# Patient Record
Sex: MALE | Employment: OTHER | URBAN - METROPOLITAN AREA
[De-identification: names, ages, dates, MRNs, and addresses within clinical notes are randomized per-mention and may not be internally consistent; named-entity substitution may affect disease eponyms.]

---

## 2022-01-14 ENCOUNTER — HOSPITAL ENCOUNTER (EMERGENCY)
Facility: HOSPITAL | Age: 87
Discharge: HOME | End: 2022-01-14
Attending: STUDENT IN AN ORGANIZED HEALTH CARE EDUCATION/TRAINING PROGRAM
Payer: MEDICARE

## 2022-01-14 VITALS
TEMPERATURE: 97 F | RESPIRATION RATE: 20 BRPM | OXYGEN SATURATION: 99 % | SYSTOLIC BLOOD PRESSURE: 189 MMHG | HEART RATE: 71 BPM | DIASTOLIC BLOOD PRESSURE: 84 MMHG

## 2022-01-14 DIAGNOSIS — R55 SYNCOPE: ICD-10-CM

## 2022-01-14 LAB
ALBUMIN SERPL BCP-MCNC: 4.1 G/DL (ref 3.5–5.2)
ALP SERPL-CCNC: 77 U/L (ref 55–135)
ALT SERPL W/O P-5'-P-CCNC: 27 U/L (ref 10–44)
ANION GAP SERPL CALC-SCNC: 8 MMOL/L (ref 8–16)
AST SERPL-CCNC: 31 U/L (ref 10–40)
BASOPHILS # BLD AUTO: 0.02 K/UL (ref 0–0.2)
BASOPHILS NFR BLD: 0.3 % (ref 0–1.9)
BILIRUB SERPL-MCNC: 0.9 MG/DL (ref 0.1–1)
BILIRUB UR QL STRIP: NEGATIVE
BUN SERPL-MCNC: 11 MG/DL (ref 8–23)
CALCIUM SERPL-MCNC: 9.5 MG/DL (ref 8.7–10.5)
CHLORIDE SERPL-SCNC: 97 MMOL/L (ref 95–110)
CLARITY UR: CLEAR
CO2 SERPL-SCNC: 27 MMOL/L (ref 23–29)
COLOR UR: YELLOW
CREAT SERPL-MCNC: 0.9 MG/DL (ref 0.5–1.4)
DIFFERENTIAL METHOD: ABNORMAL
EOSINOPHIL # BLD AUTO: 0.2 K/UL (ref 0–0.5)
EOSINOPHIL NFR BLD: 3.2 % (ref 0–8)
ERYTHROCYTE [DISTWIDTH] IN BLOOD BY AUTOMATED COUNT: 12.8 % (ref 11.5–14.5)
EST. GFR  (AFRICAN AMERICAN): >60 ML/MIN/1.73 M^2
EST. GFR  (NON AFRICAN AMERICAN): >60 ML/MIN/1.73 M^2
GLUCOSE SERPL-MCNC: 107 MG/DL (ref 70–110)
GLUCOSE UR QL STRIP: NEGATIVE
HCT VFR BLD AUTO: 37.2 % (ref 40–54)
HGB BLD-MCNC: 12.8 G/DL (ref 14–18)
HGB UR QL STRIP: NEGATIVE
IMM GRANULOCYTES # BLD AUTO: 0.03 K/UL (ref 0–0.04)
IMM GRANULOCYTES NFR BLD AUTO: 0.4 % (ref 0–0.5)
KETONES UR QL STRIP: NEGATIVE
LEUKOCYTE ESTERASE UR QL STRIP: NEGATIVE
LYMPHOCYTES # BLD AUTO: 0.9 K/UL (ref 1–4.8)
LYMPHOCYTES NFR BLD: 13.2 % (ref 18–48)
MCH RBC QN AUTO: 31.9 PG (ref 27–31)
MCHC RBC AUTO-ENTMCNC: 34.4 G/DL (ref 32–36)
MCV RBC AUTO: 93 FL (ref 82–98)
MONOCYTES # BLD AUTO: 0.6 K/UL (ref 0.3–1)
MONOCYTES NFR BLD: 8.9 % (ref 4–15)
NEUTROPHILS # BLD AUTO: 5.3 K/UL (ref 1.8–7.7)
NEUTROPHILS NFR BLD: 74 % (ref 38–73)
NITRITE UR QL STRIP: NEGATIVE
NRBC BLD-RTO: 0 /100 WBC
PH UR STRIP: 8 [PH] (ref 5–8)
PLATELET # BLD AUTO: 178 K/UL (ref 150–450)
PMV BLD AUTO: 8.8 FL (ref 9.2–12.9)
POTASSIUM SERPL-SCNC: 4.1 MMOL/L (ref 3.5–5.1)
PROT SERPL-MCNC: 7.1 G/DL (ref 6–8.4)
PROT UR QL STRIP: NEGATIVE
RBC # BLD AUTO: 4.01 M/UL (ref 4.6–6.2)
SARS-COV-2 RDRP RESP QL NAA+PROBE: NEGATIVE
SODIUM SERPL-SCNC: 132 MMOL/L (ref 136–145)
SP GR UR STRIP: 1.01 (ref 1–1.03)
TROPONIN I SERPL DL<=0.01 NG/ML-MCNC: <0.006 NG/ML (ref 0–0.03)
TSH SERPL DL<=0.005 MIU/L-ACNC: 1.56 UIU/ML (ref 0.4–4)
URN SPEC COLLECT METH UR: NORMAL
UROBILINOGEN UR STRIP-ACNC: NEGATIVE EU/DL
WBC # BLD AUTO: 7.11 K/UL (ref 3.9–12.7)

## 2022-01-14 PROCEDURE — 84484 ASSAY OF TROPONIN QUANT: CPT | Performed by: STUDENT IN AN ORGANIZED HEALTH CARE EDUCATION/TRAINING PROGRAM

## 2022-01-14 PROCEDURE — 99285 EMERGENCY DEPT VISIT HI MDM: CPT | Mod: 25

## 2022-01-14 PROCEDURE — 36415 COLL VENOUS BLD VENIPUNCTURE: CPT | Performed by: STUDENT IN AN ORGANIZED HEALTH CARE EDUCATION/TRAINING PROGRAM

## 2022-01-14 PROCEDURE — 81003 URINALYSIS AUTO W/O SCOPE: CPT | Performed by: STUDENT IN AN ORGANIZED HEALTH CARE EDUCATION/TRAINING PROGRAM

## 2022-01-14 PROCEDURE — 93010 ELECTROCARDIOGRAM REPORT: CPT | Mod: ,,, | Performed by: INTERNAL MEDICINE

## 2022-01-14 PROCEDURE — 85025 COMPLETE CBC W/AUTO DIFF WBC: CPT | Performed by: STUDENT IN AN ORGANIZED HEALTH CARE EDUCATION/TRAINING PROGRAM

## 2022-01-14 PROCEDURE — 93010 EKG 12-LEAD: ICD-10-PCS | Mod: ,,, | Performed by: INTERNAL MEDICINE

## 2022-01-14 PROCEDURE — 84443 ASSAY THYROID STIM HORMONE: CPT | Performed by: STUDENT IN AN ORGANIZED HEALTH CARE EDUCATION/TRAINING PROGRAM

## 2022-01-14 PROCEDURE — 80053 COMPREHEN METABOLIC PANEL: CPT | Performed by: STUDENT IN AN ORGANIZED HEALTH CARE EDUCATION/TRAINING PROGRAM

## 2022-01-14 PROCEDURE — U0002 COVID-19 LAB TEST NON-CDC: HCPCS | Performed by: STUDENT IN AN ORGANIZED HEALTH CARE EDUCATION/TRAINING PROGRAM

## 2022-01-14 PROCEDURE — 93005 ELECTROCARDIOGRAM TRACING: CPT

## 2022-01-14 NOTE — ED PROVIDER NOTES
Encounter Date: 1/14/2022       History     Chief Complaint   Patient presents with    Loss of Consciousness     86-year-old male with history of prior syncopal episodes, presenting with 2 syncopal episodes this evening.  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  EMS reported that they took his blood pressure and it was 80s over 50s.  They gave him 250 mL of saline, and patient's blood pressure improved to 100s.  Patient denies any chest pain, shortness of breath, recent volume losses like vomiting or diarrhea.  No other complaints.  EMS deny any tonic-clonic activity after patient lost consciousness.  Patient is adamant that this happens regularly, and that he has been seen by Neurology and Cardiology and no etiology of his syncopal episodes have been found.  Patient had no trauma when he loss consciousness.        Review of patient's allergies indicates:  No Known Allergies  No past medical history on file.  No past surgical history on file.  No family history on file.     Review of Systems   Constitutional: Negative for fever.   HENT: Negative for sore throat.    Respiratory: Negative for shortness of breath.    Cardiovascular: Negative for chest pain.   Gastrointestinal: Negative for nausea.   Genitourinary: Negative for dysuria.   Musculoskeletal: Negative for back pain.   Skin: Negative for rash.   Neurological: Positive for syncope. Negative for weakness.   Hematological: Does not bruise/bleed easily.       Physical Exam     Initial Vitals [01/14/22 0536]   BP Pulse Resp Temp SpO2   (!) 187/74 74 20 97.1 °F (36.2 °C) 99 %      MAP       --         Physical Exam    Nursing note and vitals reviewed.  Constitutional: He appears well-developed.   HENT:   Head: Atraumatic.   Eyes: EOM are normal.   Neck:   Normal range of motion.  Cardiovascular:   No murmur heard.  Pulmonary/Chest: No respiratory distress. He has no wheezes. He  has no rales.   Abdominal: He exhibits no distension. There is no abdominal tenderness. There is no rebound.   Musculoskeletal:         General: Normal range of motion.      Cervical back: Normal range of motion.      Comments: Moving all extremities. No pain with palpation to bilateral shoulders, elbows, wrists, hips, knees, ankles. No midline tenderness.        Neurological: He is alert.   Skin: Skin is warm.   Psychiatric: He has a normal mood and affect.         ED Course   Procedures  Labs Reviewed   CBC W/ AUTO DIFFERENTIAL - Abnormal; Notable for the following components:       Result Value    RBC 4.01 (*)     Hemoglobin 12.8 (*)     Hematocrit 37.2 (*)     MCH 31.9 (*)     MPV 8.8 (*)     Lymph # 0.9 (*)     Gran % 74.0 (*)     Lymph % 13.2 (*)     All other components within normal limits   COMPREHENSIVE METABOLIC PANEL - Abnormal; Notable for the following components:    Sodium 132 (*)     All other components within normal limits   TROPONIN I   TSH   URINALYSIS, REFLEX TO URINE CULTURE    Narrative:     Specimen Source->Urine   SARS-COV-2 RNA AMPLIFICATION, QUAL     EKG Readings: (Independently Interpreted)   Initial Reading: No STEMI. Rhythm: Normal Sinus Rhythm. Heart Rate: 70. Conduction: RBBB.     ECG Results          EKG 12-lead (Final result)  Result time 01/14/22 09:26:52    Final result by Interface, Lab In [facility] (01/14/22 09:26:52)                 Narrative:    Test Reason : R55    Vent. Rate : 070 BPM     Atrial Rate : 070 BPM     P-R Int : 228 ms          QRS Dur : 142 ms      QT Int : 456 ms       P-R-T Axes : 084 088 075 degrees     QTc Int : 492 ms    Sinus rhythm with 1st degree A-V block  Right bundle branch block  Abnormal ECG  No previous ECGs available  Confirmed by Corina Mohan MD ([id number]) on 1/14/2022 9:26:39 AM    Referred By: [email or web address]   SELF           Confirmed By:Corina Mohan MD                            Imaging Results          X-Ray Chest 1 View (Final result)  Result  time 01/14/22 07:51:13   Procedure changed from X-Ray Chest PA And Lateral     Final result by Rafa Evans MD (01/14/22 07:51:13)                 Impression:      No active lung disease.      Electronically signed by: Rafa Evans MD  Date:    01/14/2022  Time:    07:51             Narrative:    EXAMINATION:  XR CHEST 1 VIEW    CLINICAL HISTORY:  Syncope;.  Syncope and collapse    TECHNIQUE:  Portable chest dated January 14, 2022.    COMPARISON:  No prior study for comparison.    FINDINGS:  The cardiac silhouette is within normal limits considering portable technique.  Atherosclerotic changes of the aorta.  Skin fold overlying the right lateral chest.  No focal infiltrate or effusion.  Degenerative changes of the spine.                                 Medications - No data to display  Medical Decision Making:   Differential Diagnosis:   DDX: Syncope - concern for cardiogenic syncope given history, risk factors. Less likely neurological given nonfocal neuro exam, history, no urinary incontinence, no tongue biting, no seizure like activity. R/o hyper/hypoglycemia, occult infection.  Patient has no concerning morphologies on their EKG for any concerning underlying cardiac pathology (including ACS, Brugada, Wellens, Prolonged QT, HOCM, WPW, ARVD)  DX: BMP, CBC, trop, EKG. UA. CXR.   TX: Analgesia PRN. IVF if poor PO intake.   DISPO:  Pending workup                ED Course as of 01/14/22 1901 Fri Jan 14, 2022   0537 BP(!): 187/74  Patient not hypotensive [NB]   0545 Patient's children are here and confirm what he said about cardiology and neurology having worked him up the syncopal events.  They say that his blood pressure will transiently drop in lead to a loss of consciousness. [NB]   0635 Chest x-ray appears unremarkable. [NB]   0643 Troponin I: <0.006 [NB]      ED Course User Index  [NB] Mathew Og MD             Clinical Impression:   Final diagnoses:  [R55] Syncope          ED Disposition  Condition    Discharge Stable        ED Prescriptions     None        Follow-up Information     Follow up With Specialties Details Why Contact Info    Dona Brewster MD Internal Medicine Schedule an appointment as soon as possible for a visit in 2 days  [address] [place] [address]  [phone]      Nash Frank MD Cardiology Schedule an appointment as soon as possible for a visit in 2 days  [address] [place] [address]  [phone]             Mathew Og MD  01/14/22 0534       Mathew Og MD  01/14/22 0549       aMthew Og MD  01/14/22 1902

## 2022-01-14 NOTE — PROVIDER PROGRESS NOTES - EMERGENCY DEPT.
Emergency Room Physician       This patient has had several syncopal episodes over the years  Patient's wife was brought to the hospital, EMS at the house  Patient was making coffee for EMS, had a syncopal episode     This has happened several times over the years however no true diagnosis  Patient's workup today was within normal limits, no acute findings now    I took over this patient from the previous ER provider Dr. Og  All questions answered, will follow up with his PCP a cardiologist  Extensively counseled return to the ER with any concerns       Juan David Mcdonough M.D. 8:24 AM 1/14/2022

## 2022-01-14 NOTE — ED TRIAGE NOTES
Pt arrived to ED c/o 2x syncopal episodes. Pt did not hit head. Intital blood pressure was 88/55. [facility] gave 1 L of fluids. Pt AAOx3